# Patient Record
Sex: MALE | Race: WHITE | Employment: UNEMPLOYED | URBAN - METROPOLITAN AREA
[De-identification: names, ages, dates, MRNs, and addresses within clinical notes are randomized per-mention and may not be internally consistent; named-entity substitution may affect disease eponyms.]

---

## 2018-03-22 ENCOUNTER — HOSPITAL ENCOUNTER (EMERGENCY)
Facility: CLINIC | Age: 48
Discharge: HOME | End: 2018-03-22
Attending: EMERGENCY MEDICINE
Payer: COMMERCIAL

## 2018-03-22 VITALS
BODY MASS INDEX: 24.25 KG/M2 | DIASTOLIC BLOOD PRESSURE: 89 MMHG | WEIGHT: 160 LBS | HEART RATE: 80 BPM | OXYGEN SATURATION: 96 % | TEMPERATURE: 97.7 F | RESPIRATION RATE: 16 BRPM | SYSTOLIC BLOOD PRESSURE: 141 MMHG | HEIGHT: 68 IN

## 2018-03-22 DIAGNOSIS — J32.0 CHRONIC MAXILLARY SINUSITIS: Primary | ICD-10-CM

## 2018-03-22 PROCEDURE — 99282 EMERGENCY DEPT VISIT SF MDM: CPT

## 2018-03-22 PROCEDURE — 6370000000 HC RX 637 (ALT 250 FOR IP): Performed by: EMERGENCY MEDICINE

## 2018-03-22 RX ORDER — PREDNISONE 20 MG/1
60 TABLET ORAL ONCE
Status: COMPLETED | OUTPATIENT
Start: 2018-03-22 | End: 2018-03-22

## 2018-03-22 RX ORDER — OXYCODONE HYDROCHLORIDE AND ACETAMINOPHEN 5; 325 MG/1; MG/1
2 TABLET ORAL ONCE
Status: COMPLETED | OUTPATIENT
Start: 2018-03-22 | End: 2018-03-22

## 2018-03-22 RX ORDER — PREDNISONE 50 MG/1
50 TABLET ORAL DAILY
Qty: 4 TABLET | Refills: 0 | Status: SHIPPED | OUTPATIENT
Start: 2018-03-22 | End: 2018-03-26

## 2018-03-22 RX ADMIN — OXYCODONE HYDROCHLORIDE AND ACETAMINOPHEN 2 TABLET: 5; 325 TABLET ORAL at 20:28

## 2018-03-22 RX ADMIN — PREDNISONE 60 MG: 20 TABLET ORAL at 20:28

## 2018-03-22 ASSESSMENT — PAIN SCALES - GENERAL
PAINLEVEL_OUTOF10: 9
PAINLEVEL_OUTOF10: 9

## 2018-03-22 ASSESSMENT — PAIN DESCRIPTION - ORIENTATION: ORIENTATION: LEFT;RIGHT

## 2018-03-22 ASSESSMENT — PAIN DESCRIPTION - LOCATION: LOCATION: FACE;EAR;JAW

## 2018-03-22 ASSESSMENT — PAIN DESCRIPTION - PAIN TYPE: TYPE: ACUTE PAIN

## 2018-03-22 ASSESSMENT — PAIN DESCRIPTION - FREQUENCY: FREQUENCY: CONTINUOUS

## 2018-03-22 ASSESSMENT — PAIN DESCRIPTION - DESCRIPTORS: DESCRIPTORS: PRESSURE

## 2018-03-23 NOTE — ED TRIAGE NOTES
Pt to ed with complaints of sinus pressure, from behind the eyes, extending to the face and ears. Pt called pcp and was called in a prescription which he has not picked up yet.

## 2021-08-13 ENCOUNTER — HOSPITAL ENCOUNTER (EMERGENCY)
Facility: CLINIC | Age: 51
Discharge: HOME | End: 2021-08-13
Attending: EMERGENCY MEDICINE

## 2021-08-13 VITALS
BODY MASS INDEX: 22.43 KG/M2 | SYSTOLIC BLOOD PRESSURE: 167 MMHG | OXYGEN SATURATION: 100 % | TEMPERATURE: 97.9 F | HEIGHT: 68 IN | RESPIRATION RATE: 17 BRPM | DIASTOLIC BLOOD PRESSURE: 84 MMHG | HEART RATE: 83 BPM | WEIGHT: 148 LBS

## 2021-08-13 DIAGNOSIS — R06.00 DYSPNEA, UNSPECIFIED TYPE: Primary | ICD-10-CM

## 2021-08-13 PROCEDURE — 99285 EMERGENCY DEPT VISIT HI MDM: CPT

## 2021-08-13 RX ORDER — LEVETIRACETAM 500 MG/1
1000 TABLET ORAL 2 TIMES DAILY
COMMUNITY
End: 2022-10-31

## 2021-08-13 RX ORDER — ZIPRASIDONE HYDROCHLORIDE 40 MG/1
40 CAPSULE ORAL 2 TIMES DAILY WITH MEALS
COMMUNITY

## 2021-08-13 ASSESSMENT — PAIN DESCRIPTION - LOCATION: LOCATION: HEAD

## 2021-08-13 ASSESSMENT — PAIN DESCRIPTION - DESCRIPTORS: DESCRIPTORS: DULL

## 2021-08-13 ASSESSMENT — PAIN SCALES - GENERAL: PAINLEVEL_OUTOF10: 4

## 2021-08-13 ASSESSMENT — PAIN DESCRIPTION - PAIN TYPE: TYPE: ACUTE PAIN

## 2021-08-14 NOTE — ED PROVIDER NOTES
eMERGENCY dEPARTMENT eNCOUnter      Pt Name: Hamlet Arguelles  MRN: [id number]  [place] 1970  Date of evaluation: 8/13/2021      CHIEF COMPLAINT       Chief Complaint   Patient presents with    Shortness of Breath    Dizziness    Headache         HISTORY OF PRESENT ILLNESS    Hamlet Arguelles is a 45 y.o. male who presents for his of breath and lightheadedness. Patient states he feels much better at this time he apparently has a history of seizures he states often his seizures start off with shortness of breath lightheadedness often is found on the floor later he says he did not get that way started feeling little lightheaded started getting short of breath by time the squad arrived he was starting feel better as he was coming down his breathing was more back to normal he denied associated chest pain shortness of breath fever chills denied any any other complaints to me at this time        REVIEW OF SYSTEMS       Review of systems are all reviewed and negative except stated above in HPI    [address]    has a past medical history of Anxiety, HIV (human immunodeficiency virus infection) ([facility] [address] and Seizures ([facility] [address] SURGICAL HISTORY      has a past surgical history that includes Tonsillectomy and adenoidectomy; Nasal septum surgery; and Hemorrhoid surgery. CURRENT MEDICATIONS       Discharge Medication List as of 8/13/2021  9:13 PM      CONTINUE these medications which have NOT CHANGED    Details   levETIRAcetam (KEPPRA) 500 MG tablet Take 1,000 mg by mouth 2 times daily 1g in AMHistorical Med      Quuzlvo-Vipup-Qnxokrwz-TenofAF (GENVOYA PO) Take by mouthHistorical Med      ziprasidone (GEODON) 40 MG capsule Take 40 mg by mouth 2 times daily (with meals)Historical Med             ALLERGIES     is allergic to vicodin [hydrocodone-acetaminophen]. FAMILY HISTORY     has no family status information on file. family history is not on file.     SOCIAL HISTORY      reports that he has been smoking. He has been smoking about 1.00 pack per day. He has never used smokeless tobacco. He reports current alcohol use. He reports that he does not use drugs. PHYSICAL EXAM     INITIAL VITALS:  height is 5' 8\" (1.727 m) and weight is 67.1 kg (148 lb). His oral temperature is 97.9 °F (36.6 °C). His blood pressure is 167/84 (abnormal) and his pulse is 83. His respiration is 17 and oxygen saturation is 100%. General: Patient alert nontoxic-appearing male in no apparent distress  HEENT: Head is atraumatic  Neck: Supple  Respiratory: Lung sounds are clear bilateral  Cardiac: Heart is regular rate and rhythm  GI: Abdomen soft nontender  Neuro: Patient has no gross focal neurological deficits at bedside exam    DIFFERENTIAL DIAGNOSIS/ MDM:     Seizure aura anxiety dyspnea    DIAGNOSTIC RESULTS     EKG: All EKG's are interpreted by the Emergency Department Physician who either signs or Co-signs this chart in the absence of a cardiologist.    EKG interpreted by me reveals normal sinus rhythm with a rate of 79 with no acute ST elevation depressions normal GA interval normal QS complex normal axis    RADIOLOGY:   I directly visualized the following  images and reviewed the radiologist interpretations:  No orders to display         LABS:  Labs Reviewed - No data to display      EMERGENCY DEPARTMENT COURSE:   Vitals:    Vitals:    08/13/21 1955   BP: (!) 167/84   Pulse: 83   Resp: 17   Temp: 97.9 °F (36.6 °C)   TempSrc: Oral   SpO2: 100%   Weight: 67.1 kg (148 lb)   Height: 5' 8\" (1.727 m)     -------------------------  BP: (!) 167/84, Temp: 97.9 °F (36.6 °C), Pulse: 83, Resp: 17    No orders of the defined types were placed in this encounter.           Re-evaluation Notes    Patient states he is feeling better he is uncertain whether he just gets anxious when he starts feeling this way as he does not want to have 1 of these passing out episodes respiratory rate is normal sats are normal he has no focal findings on exam we did observe him for an hour he states that he feels perfectly fine wants to go home this time will be discharged with follow-up and return if worse    CRITICAL CARE:   None      CONSULTS:      PROCEDURES:  None    FINAL IMPRESSION      1. Dyspnea, unspecified type          DISPOSITION/PLAN   DISPOSITION Decision To Discharge 08/13/2021 09:12:52 PM      Condition on Disposition    Stable    PATIENT REFERRED TO:  Mily Buckley MD  [address] [place] [address]  [phone]      As needed      DISCHARGE MEDICATIONS:  Discharge Medication List as of 8/13/2021  9:13 PM          (Please note that portions of this note were completed with a voice recognition program.  Efforts were made to edit the dictations but occasionally words are mis-transcribed.)    Molly Loya MD,, MD, F.A.C.E.P.   Attending Emergency Physician        Molly Loya MD  08/13/21 1731

## 2021-08-14 NOTE — ED NOTES
Mode of arrival (squad #, walk in, police, etc) : Medic 59        Chief complaint(s): shortness of breath        Arrival Note (brief scenario, treatment PTA, etc). : Pt brought in by squad c/o shortness of breath and anxiety an hour prior to calling EMS. Pt states he has a Hx of seizures. Pt states this is how he normally gets before he has an episode. Pt states he did take his keppra prior to EMS arrival. Pt alert and oriented. No sing of distress noted. C= \"Have you ever felt that you should Cut down on your drinking? \"  No  A= \"Have people Annoyed you by criticizing your drinking? \"  No  G= \"Have you ever felt bad or Guilty about your drinking? \"  No  E= \"Have you ever had a drink as an Eye-opener first thing in the morning to steady your nerves or to help a hangover? \"  No      Deferred []      Reason for deferring: N/A]    *If yes to two or more: probable alcohol abuse. Delsa Peabody, RN  08/14/21 6401

## 2022-01-28 ENCOUNTER — HOSPITAL ENCOUNTER (EMERGENCY)
Facility: CLINIC | Age: 52
Discharge: HOME | End: 2022-01-28
Attending: EMERGENCY MEDICINE
Payer: MEDICAID

## 2022-01-28 VITALS
HEIGHT: 68 IN | OXYGEN SATURATION: 99 % | SYSTOLIC BLOOD PRESSURE: 138 MMHG | RESPIRATION RATE: 15 BRPM | TEMPERATURE: 98.4 F | HEART RATE: 70 BPM | WEIGHT: 150 LBS | BODY MASS INDEX: 22.73 KG/M2 | DIASTOLIC BLOOD PRESSURE: 82 MMHG

## 2022-01-28 DIAGNOSIS — G40.919 BREAKTHROUGH SEIZURE (HCC): Primary | ICD-10-CM

## 2022-01-28 DIAGNOSIS — G40.909 SEIZURE DISORDER (HCC): ICD-10-CM

## 2022-01-28 LAB
ABSOLUTE EOS #: 0.1 K/UL (ref 0–0.4)
ABSOLUTE IMMATURE GRANULOCYTE: ABNORMAL K/UL (ref 0–0.3)
ABSOLUTE LYMPH #: 2.9 K/UL (ref 1–4.8)
ABSOLUTE MONO #: 0.9 K/UL (ref 0.1–1.2)
ANION GAP: 9 MMOL/L (ref 7–16)
BASOPHILS # BLD: 2 % (ref 0–2)
BASOPHILS ABSOLUTE: 0.2 K/UL (ref 0–0.2)
DIFFERENTIAL TYPE: ABNORMAL
EOSINOPHILS RELATIVE PERCENT: 1 % (ref 1–4)
GFR NON-AFRICAN AMERICAN: NORMAL ML/MIN
GFR SERPL CREATININE-BSD FRML MDRD: NORMAL ML/MIN
GFR SERPL CREATININE-BSD FRML MDRD: NORMAL ML/MIN/{1.73_M2}
GLUCOSE BLD-MCNC: 163 MG/DL (ref 74–100)
HCT VFR BLD CALC: 46.4 % (ref 41–53)
HEMOGLOBIN: 15.6 G/DL (ref 13.5–17.5)
IMMATURE GRANULOCYTES: ABNORMAL %
KEPPRA: 3 UG/ML
LYMPHOCYTES # BLD: 22 % (ref 24–44)
MCH RBC QN AUTO: 33.5 PG (ref 26–34)
MCHC RBC AUTO-ENTMCNC: 33.6 G/DL (ref 31–37)
MCV RBC AUTO: 99.6 FL (ref 80–100)
MONOCYTES # BLD: 7 % (ref 2–11)
NRBC AUTOMATED: ABNORMAL PER 100 WBC
PDW BLD-RTO: 13.3 % (ref 12.5–15.4)
PLATELET # BLD: 186 K/UL (ref 140–450)
PLATELET ESTIMATE: ABNORMAL
PMV BLD AUTO: 8.9 FL (ref 6–12)
POC BUN: 18 MG/DL (ref 8–26)
POC CHLORIDE: 100 MMOL/L (ref 98–107)
POC CREATININE: 0.86 MG/DL (ref 0.51–1.19)
POC IONIZED CALCIUM: 1.19 MMOL/L (ref 1.15–1.33)
POC POTASSIUM: 4 MMOL/L (ref 3.5–4.5)
POC SODIUM: 136 MMOL/L (ref 138–146)
POC TCO2: 28 MMOL/L (ref 22–30)
RBC # BLD: 4.65 M/UL (ref 4.5–5.9)
RBC # BLD: ABNORMAL 10*6/UL
SEG NEUTROPHILS: 68 % (ref 36–66)
SEGMENTED NEUTROPHILS ABSOLUTE COUNT: 9.3 K/UL (ref 1.8–7.7)
WBC # BLD: 13.4 K/UL (ref 3.5–11)
WBC # BLD: ABNORMAL 10*3/UL

## 2022-01-28 PROCEDURE — 82947 ASSAY GLUCOSE BLOOD QUANT: CPT

## 2022-01-28 PROCEDURE — 82565 ASSAY OF CREATININE: CPT

## 2022-01-28 PROCEDURE — 82330 ASSAY OF CALCIUM: CPT

## 2022-01-28 PROCEDURE — 85025 COMPLETE CBC W/AUTO DIFF WBC: CPT

## 2022-01-28 PROCEDURE — 84520 ASSAY OF UREA NITROGEN: CPT

## 2022-01-28 PROCEDURE — 80177 DRUG SCRN QUAN LEVETIRACETAM: CPT

## 2022-01-28 PROCEDURE — 99285 EMERGENCY DEPT VISIT HI MDM: CPT

## 2022-01-28 PROCEDURE — 36415 COLL VENOUS BLD VENIPUNCTURE: CPT

## 2022-01-28 PROCEDURE — 80051 ELECTROLYTE PANEL: CPT

## 2022-01-28 RX ORDER — LEVETIRACETAM 750 MG/1
1500 TABLET ORAL ONCE
Status: DISCONTINUED | OUTPATIENT
Start: 2022-01-28 | End: 2022-01-28 | Stop reason: HOSPADM

## 2022-01-28 NOTE — ED PROVIDER NOTES
[facility] ED  [address] [facility]  Phone: [address]      Pt Name: Claritza Bangura  MRN: [id number]  [place] 1970  Date of evaluation: 1/28/2022    CHIEF COMPLAINT       Chief Complaint   Patient presents with    Seizures       HISTORY OF PRESENT ILLNESS    Claritza Bangura is a 45 y.o. male who presents with a history of having a seizure today. States he has had seizures for many years and today he came to the ER at the urgings of his roommate. States that he usually has a seizure almost every day. He has not missed any doses of his medicines he states that he recently had a EEG performed at home. He states that he sees the neurologist at the [facility]. He states that he is currently taking Keppra 1500 mg every 12 hours. No recent adjustments in his medicines have been made. States that he has been eating and sleeping as he normally does. Denies alcohol use or other meds. REVIEW OF SYSTEMS     Constitutional: No fevers or chills   HEENT: No sore throat, rhinorrhea, or earache   Eyes: No blurry vision or double vision no drainage   Cardiovascular: No chest pain or tachycardia   Respiratory: No wheezing or shortness of breath no cough   Gastrointestinal: No nausea, vomiting, diarrhea, constipation, or abdominal pain   : No hematuria or dysuria   Musculoskeletal: No swelling or pain   Skin: No rash   Neurological: See above  PAST MEDICAL HISTORY    has a past medical history of Anxiety, HIV (human immunodeficiency virus infection) ([facility] [address] and Seizures ([facility] [address] SURGICAL HISTORY      has a past surgical history that includes Tonsillectomy and adenoidectomy; Nasal septum surgery; and Hemorrhoid surgery.     CURRENT MEDICATIONS       Previous Medications    PRRDJSX-IWQXN-UBLKDOYH-TENOFAF (GENVOYA PO)    Take by mouth    LEVETIRACETAM (KEPPRA) 500 MG TABLET    Take 1,000 mg by mouth 2 times daily 1g in AM    ZIPRASIDONE (GEODON) 40 MG CAPSULE    Take 15.4 %    Platelets 669 921 - 437 k/uL    MPV 8.9 6.0 - 12.0 fL    NRBC Automated NOT REPORTED per 100 WBC    Differential Type NOT REPORTED     Seg Neutrophils 68 (H) 36 - 66 %    Lymphocytes 22 (L) 24 - 44 %    Monocytes 7 2 - 11 %    Eosinophils % 1 1 - 4 %    Basophils 2 0 - 2 %    Immature Granulocytes NOT REPORTED 0 %    Segs Absolute 9.30 (H) 1.8 - 7.7 k/uL    Absolute Lymph # 2.90 1.0 - 4.8 k/uL    Absolute Mono # 0.90 0.1 - 1.2 k/uL    Absolute Eos # 0.10 0.0 - 0.4 k/uL    Basophils Absolute 0.20 0.0 - 0.2 k/uL    Absolute Immature Granulocyte NOT REPORTED 0.00 - 0.30 k/uL    WBC Morphology NOT REPORTED     RBC Morphology NOT REPORTED     Platelet Estimate NOT REPORTED    ELECTROLYTES PLUS   Result Value Ref Range    POC Sodium 136 (L) 138 - 146 mmol/L    POC Potassium 4.0 3.5 - 4.5 mmol/L    POC Chloride 100 98 - 107 mmol/L    POC TCO2 28 22 - 30 mmol/L    Anion Gap 9 7 - 16 mmol/L   CALCIUM, IONIC (POC)   Result Value Ref Range    POC Ionized Calcium 1.19 1.15 - 1.33 mmol/L   Creatinine W/GFR Point of Care   Result Value Ref Range    POC Creatinine 0.86 0.51 - 1.19 mg/dL    GFR Comment NOT REPORTED >60 mL/min    GFR Non- NOT REPORTED >60 mL/min    GFR Comment         POCT urea (BUN)   Result Value Ref Range    POC BUN 18 8 - 26 mg/dL   POCT Glucose   Result Value Ref Range    POC Glucose 163 (H) 74 - 100 mg/dL       Not indicated unless otherwise documented above    RADIOLOGY:   I reviewed the radiologist interpretations:    No orders to display       Not indicated unless otherwise documented above    EMERGENCY DEPARTMENT COURSE:     The patient was given the following medications:  Orders Placed This Encounter   Medications    levETIRAcetam (KEPPRA) tablet 1,500 mg        Vitals:   -------------------------  BP (!) 142/84   Pulse 71   Resp 16   Ht 5' 8\" (1.727 m)   Wt 68 kg (150 lb)   SpO2 100%   BMI 22.81 kg/m²         I have reviewed the disposition diagnosis with the patient and or their family/guardian. I have answered their questions and given discharge instructions. They voiced understanding of these instructions and did not have any furtherquestions or complaints. CRITICAL CARE:    None    CONSULTS:    None    PROCEDURES:    None      OARRS Report if indicated             FINAL IMPRESSION      1. Breakthrough seizure ([facility] [address]    2. Seizure disorder [facility])          DISPOSITION/PLAN   DISPOSITION Decision To Discharge 01/28/2022 06:45:28 PM        CONDITION ON DISPOSITION: STABLE       PATIENT REFERRED TO:  No follow-up provider specified.     DISCHARGE MEDICATIONS:  New Prescriptions    No medications on file       (Please note that portions of thisnote were completed with a voice recognition program.  Efforts were made to edit the dictations but occasionally words are mis-transcribed.)    Biju Melendrez MD,, MD  Attending Emergency Physician        Biju Melendrez MD  01/28/22 5964

## 2022-10-31 ENCOUNTER — HOSPITAL ENCOUNTER (EMERGENCY)
Facility: CLINIC | Age: 52
Discharge: HOME | End: 2022-10-31
Attending: EMERGENCY MEDICINE
Payer: MEDICAID

## 2022-10-31 ENCOUNTER — APPOINTMENT (OUTPATIENT)
Dept: CT IMAGING | Facility: CLINIC | Age: 52
End: 2022-10-31
Payer: MEDICAID

## 2022-10-31 VITALS
BODY MASS INDEX: 23.04 KG/M2 | HEART RATE: 67 BPM | RESPIRATION RATE: 16 BRPM | DIASTOLIC BLOOD PRESSURE: 91 MMHG | SYSTOLIC BLOOD PRESSURE: 140 MMHG | TEMPERATURE: 98 F | OXYGEN SATURATION: 97 % | HEIGHT: 68 IN | WEIGHT: 152 LBS

## 2022-10-31 DIAGNOSIS — Z87.898 HISTORY OF SEIZURE: Primary | ICD-10-CM

## 2022-10-31 DIAGNOSIS — I10 ESSENTIAL HYPERTENSION: ICD-10-CM

## 2022-10-31 LAB
ABSOLUTE EOS #: 0.5 K/UL (ref 0–0.4)
ABSOLUTE LYMPH #: 2.5 K/UL (ref 1–4.8)
ABSOLUTE MONO #: 0.8 K/UL (ref 0.1–1.2)
ALBUMIN SERPL-MCNC: 4.6 G/DL (ref 3.5–5.2)
ALBUMIN/GLOBULIN RATIO: 1.8 (ref 1–2.5)
ALP BLD-CCNC: 104 U/L (ref 40–129)
ALT SERPL-CCNC: 32 U/L (ref 5–41)
ANION GAP SERPL CALCULATED.3IONS-SCNC: 5 MMOL/L (ref 9–17)
AST SERPL-CCNC: 20 U/L
BASOPHILS # BLD: 1 % (ref 0–2)
BASOPHILS ABSOLUTE: 0.1 K/UL (ref 0–0.2)
BILIRUB SERPL-MCNC: 0.4 MG/DL (ref 0.3–1.2)
BUN BLDV-MCNC: 15 MG/DL (ref 6–20)
CALCIUM SERPL-MCNC: 9.1 MG/DL (ref 8.6–10.4)
CHLORIDE BLD-SCNC: 105 MMOL/L (ref 98–107)
CO2: 29 MMOL/L (ref 20–31)
CREAT SERPL-MCNC: 0.6 MG/DL (ref 0.7–1.2)
EOSINOPHILS RELATIVE PERCENT: 6 % (ref 1–4)
GFR SERPL CREATININE-BSD FRML MDRD: >60 ML/MIN/1.73M2
GLUCOSE BLD-MCNC: 99 MG/DL (ref 70–99)
HCT VFR BLD CALC: 50.2 % (ref 41–53)
HEMOGLOBIN: 16.4 G/DL (ref 13.5–17.5)
LAMOTRIGINE LEVEL: 4.7 UG/ML (ref 3–15)
LYMPHOCYTES # BLD: 30 % (ref 24–44)
MCH RBC QN AUTO: 33 PG (ref 26–34)
MCHC RBC AUTO-ENTMCNC: 32.6 G/DL (ref 31–37)
MCV RBC AUTO: 101.3 FL (ref 80–100)
MONOCYTES # BLD: 10 % (ref 2–11)
PDW BLD-RTO: 13.2 % (ref 12.5–15.4)
PLATELET # BLD: 213 K/UL (ref 140–450)
PMV BLD AUTO: 8.7 FL (ref 6–12)
POTASSIUM SERPL-SCNC: 4.5 MMOL/L (ref 3.7–5.3)
RBC # BLD: 4.96 M/UL (ref 4.5–5.9)
SEG NEUTROPHILS: 53 % (ref 36–66)
SEGMENTED NEUTROPHILS ABSOLUTE COUNT: 4.4 K/UL (ref 1.8–7.7)
SODIUM BLD-SCNC: 139 MMOL/L (ref 135–144)
TOTAL PROTEIN: 7.1 G/DL (ref 6.4–8.3)
WBC # BLD: 8.3 K/UL (ref 3.5–11)

## 2022-10-31 PROCEDURE — 99284 EMERGENCY DEPT VISIT MOD MDM: CPT

## 2022-10-31 PROCEDURE — 36415 COLL VENOUS BLD VENIPUNCTURE: CPT

## 2022-10-31 PROCEDURE — 80053 COMPREHEN METABOLIC PANEL: CPT

## 2022-10-31 PROCEDURE — 70450 CT HEAD/BRAIN W/O DYE: CPT

## 2022-10-31 PROCEDURE — 85025 COMPLETE CBC W/AUTO DIFF WBC: CPT

## 2022-10-31 PROCEDURE — 2580000003 HC RX 258: Performed by: EMERGENCY MEDICINE

## 2022-10-31 PROCEDURE — 80175 DRUG SCREEN QUAN LAMOTRIGINE: CPT

## 2022-10-31 RX ORDER — 0.9 % SODIUM CHLORIDE 0.9 %
1000 INTRAVENOUS SOLUTION INTRAVENOUS ONCE
Status: COMPLETED | OUTPATIENT
Start: 2022-10-31 | End: 2022-10-31

## 2022-10-31 RX ORDER — LAMOTRIGINE 200 MG/1
200 TABLET ORAL 2 TIMES DAILY
COMMUNITY

## 2022-10-31 RX ADMIN — SODIUM CHLORIDE 1000 ML: 9 INJECTION, SOLUTION INTRAVENOUS at 11:54

## 2022-10-31 ASSESSMENT — PAIN - FUNCTIONAL ASSESSMENT: PAIN_FUNCTIONAL_ASSESSMENT: NONE - DENIES PAIN

## 2022-10-31 NOTE — ED PROVIDER NOTES
[address] ENCOUNTER      Pt Name: Jean Claude Flanagan  MRN: [id number]  [place] 1970  Date of evaluation: 10/31/2022      CHIEF COMPLAINT       Chief Complaint   Patient presents with    Seizures     Have Hx of seizures, last seizure was on Thursday, room mate found him on floor, hit left side of head, feeling groggy and tired since then         [facility]      The patient presents with increasing seizures. His last seizure was Thursday. Today is Monday. The patient has been referred to his seizure specialist.  He goes to the [address] [place] [address] to see a neurologist.  He says he has been feeling groggy and tired since he fell last Thursday after a seizure. He did strike his head. He has not been vomiting however. He has not had mental status change since. The patient is on Lamictal.  He has not been changing his medications around. He has a history of HIV but says his viral load is undetectable. The patient denies fever or cough. He denies focal weakness or numbness. He says he sometimes feels dizzy and see spots prior to his seizure and he has been having more of that over the past few days. REVIEW OF SYSTEMS       All systems reviewed and negative unless noted in HPI. The patient denies fever or constitutional symptoms. Sometimes sees spots prior to seizure. No vision change at this time. Denies any sore throat or rhinorrhea. Denies any neck pain or stiffness. Denies chest pain or shortness of breath. No nausea,  vomiting or diarrhea. Denies any dysuria. Denies urinary frequency or hematuria. Denies musculoskeletal injury or pain. Denies any weakness, numbness or focal neurologic deficit. Increased seizures. History of seizure disorder. Denies any skin rash or edema. No recent psychiatric issues. History of HIV.        PAST MEDICAL HISTORY    has a past medical history of Anxiety, HIV (human immunodeficiency virus infection) ([facility] [address] and Seizures ([facility] [address] SURGICAL HISTORY      has a past surgical history that includes Tonsillectomy and adenoidectomy; Nasal septum surgery; and Hemorrhoid surgery. CURRENT MEDICATIONS       Previous Medications    BICTEGRAVIR-EMTRICITAB-TENOFOV (BIKTARVY PO)    Take by mouth    LAMOTRIGINE (LAMICTAL) 200 MG TABLET    Take 200 mg by mouth 2 times daily    ZIPRASIDONE (GEODON) 40 MG CAPSULE    Take 40 mg by mouth 2 times daily (with meals)       ALLERGIES     is allergic to vicodin [hydrocodone-acetaminophen]. FAMILY HISTORY     has no family status information on file. family history is not on file. SOCIAL HISTORY      reports that he has been smoking. He has been smoking an average of 1 pack per day. He has never used smokeless tobacco. He reports current alcohol use. He reports that he does not use drugs. PHYSICAL EXAM     INITIAL VITALS:  height is 5' 8\" (1.727 m) and weight is 68.9 kg (152 lb). His oral temperature is 98 °F (36.7 °C). His blood pressure is 140/91 (abnormal) and his pulse is 67. His respiration is 16 and oxygen saturation is 97%. The patient is alert and oriented, in no apparent distress. HEENT is atraumatic. Pupils are PERRL at 4 mm with normal extraocular motion. No nystagmus. Mucous membranes moist.    Neck is supple. No meningismus. Heart sounds regular rate and rhythm with no gallops, murmurs, or rubs. Lungs clear, no wheezes, rales or rhonchi. Abdomen: soft, nontender with no pain to palpation. No pulsatile mass. No rebound or guarding. Musculoskeletal exam shows no evidence of trauma. Normal distal pulses in all extremities. Skin: no rash or edema. Neurological exam reveals cranial nerves 2 through 12 grossly intact. Patient has equal  and normal deep tendon reflexes. Psychiatric: no hallucinations or suicidal ideation. Lymphatics.:  No lymphadenopathy.        DIFFERENTIAL DIAGNOSIS/ MDM:     Dehydration, subtherapeutic Lamictal level, intracranial hemorrhage, head injury    DIAGNOSTIC RESULTS     RADIOLOGY:   I reviewed the radiologist interpretations:  CT HEAD WO CONTRAST   Final Result   No acute intracranial abnormality. CT HEAD WO CONTRAST (Final result)  Result time 10/31/22 12:37:59  Final result by Mitzy Cornelius MD (10/31/22 12:37:59)                Impression:    No acute intracranial abnormality. Narrative:    EXAMINATION:   CT OF THE HEAD WITHOUT CONTRAST  10/31/2022 9:21 am     TECHNIQUE:   CT of the head was performed without the administration of intravenous   contrast. Automated exposure control, iterative reconstruction, and/or weight   based adjustment of the mA/kV was utilized to reduce the radiation dose to as   low as reasonably achievable. COMPARISON:   None. HISTORY:   ORDERING SYSTEM PROVIDED HISTORY: fall; history of seizures   TECHNOLOGIST PROVIDED HISTORY:     fall; history of seizures   Decision Support Exception - unselect if not a suspected or confirmed   emergency medical condition->Emergency Medical Condition (MA)   Reason for Exam: Eval s/p possible unwitnessed seizure. Pt was found on the   floor by roommate. Pt has hx seizures. Pt states no acute head complaints at   time of exam.   Relevant Medical/Surgical History: seizures     FINDINGS:   BRAIN/VENTRICLES: There is no acute intracranial hemorrhage, mass effect or   midline shift. No abnormal extra-axial fluid collection. The gray-white   differentiation is maintained without evidence of an acute infarct. There is   no evidence of hydrocephalus. ORBITS: The visualized portion of the orbits demonstrate no acute abnormality. SINUSES: The visualized paranasal sinuses and mastoid air cells demonstrate   no acute abnormality. Cerumen in the external auditory canals. SOFT TISSUES/SKULL:  No acute abnormality of the visualized skull or soft   tissues.  Vascular calcifications are seen compatible with atherosclerotic   disease. LABS:  Results for orders placed or performed during the hospital encounter of 10/31/22   CBC with Auto Differential   Result Value Ref Range    WBC 8.3 3.5 - 11.0 k/uL    RBC 4.96 4.5 - 5.9 m/uL    Hemoglobin 16.4 13.5 - 17.5 g/dL    Hematocrit 50.2 41 - 53 %    .3 (H) 80 - 100 fL    MCH 33.0 26 - 34 pg    MCHC 32.6 31 - 37 g/dL    RDW 13.2 12.5 - 15.4 %    Platelets 921 136 - 031 k/uL    MPV 8.7 6.0 - 12.0 fL    Seg Neutrophils 53 36 - 66 %    Lymphocytes 30 24 - 44 %    Monocytes 10 2 - 11 %    Eosinophils % 6 (H) 1 - 4 %    Basophils 1 0 - 2 %    Segs Absolute 4.40 1.8 - 7.7 k/uL    Absolute Lymph # 2.50 1.0 - 4.8 k/uL    Absolute Mono # 0.80 0.1 - 1.2 k/uL    Absolute Eos # 0.50 (H) 0.0 - 0.4 k/uL    Basophils Absolute 0.10 0.0 - 0.2 k/uL   Comprehensive Metabolic Panel   Result Value Ref Range    Glucose 99 70 - 99 mg/dL    BUN 15 6 - 20 mg/dL    Creatinine 0.60 (L) 0.70 - 1.20 mg/dL    Est, Glom Filt Rate >60 >60 mL/min/1.73m2    Calcium 9.1 8.6 - 10.4 mg/dL    Sodium 139 135 - 144 mmol/L    Potassium 4.5 3.7 - 5.3 mmol/L    Chloride 105 98 - 107 mmol/L    CO2 29 20 - 31 mmol/L    Anion Gap 5 (L) 9 - 17 mmol/L    Alkaline Phosphatase 104 40 - 129 U/L    ALT 32 5 - 41 U/L    AST 20 <40 U/L    Total Bilirubin 0.4 0.3 - 1.2 mg/dL    Total Protein 7.1 6.4 - 8.3 g/dL    Albumin 4.6 3.5 - 5.2 g/dL    Albumin/Globulin Ratio 1.8 1.0 - 2.5         EMERGENCY DEPARTMENT COURSE:   Vitals:    Vitals:    10/31/22 1124   BP: (!) 140/91   Pulse: 67   Resp: 16   Temp: 98 °F (36.7 °C)   TempSrc: Oral   SpO2: 97%   Weight: 68.9 kg (152 lb)   Height: 5' 8\" (1.727 m)     -------------------------  BP: (!) 140/91, Temp: 98 °F (36.7 °C), Heart Rate: 67, Resp: 16      Re-evaluation Notes    The Lamictal level is a send out. The patient can follow-up with his neurologist about this result the later date. His CT scan is reassuring.   The patient does have some elevated blood pressure which could contribute to his symptoms. I am advising that he follow-up with his doctor about this. He is discharged in good condition. FINAL IMPRESSION      1. History of seizure    2.  Essential hypertension          DISPOSITION/PLAN   DISPOSITION Decision To Discharge 10/31/2022 12:43:31 PM      Condition on Disposition    good    PATIENT REFERRED TO:  your neurologist and your primary care physician    In 2 days      DISCHARGE MEDICATIONS:  New Prescriptions    No medications on file       (Please note that portions of this note were completed with a voice recognition program.  Efforts were made to edit the dictations but occasionally words are mis-transcribed.)    Emilie Galdamez MD,, MD   Attending Emergency Physician       Elsi Lorenzana MD  10/31/22 8868

## 2022-10-31 NOTE — DISCHARGE INSTRUCTIONS
Talk to your neurologist about your seizure disorder and medications. Talk to your primary doctor about your high blood pressure. Return for seizures, mental status change, or if worse in any way. Please understand that at this time there is no evidence for a more serious underlying process, but that early in the process of an illness or injury, an emergency department workup can be falsely reassuring. You should contact your family doctor within the next 48 hours for a follow up appointment    Esvinkatarzynadavid Broderick!!!    From [facility] ([facility]) and [facility] Emergency Services    On behalf of the Emergency Department staff at [facility]), I would like to thank you for giving us the opportunity to address your health care needs and concerns. We hope that during your visit, our service was delivered in a professional and caring manner. Please keep [facility] ([facility]) in mind as we walk with you down the path to your own personal wellness. Please expect an automated text message or email from us so we can ask a few questions about your health and progress. Based on your answers, a clinician may call you back to offer help and instructions. Please understand that early in the process of an illness or injury, an emergency department workup can be falsely reassuring. If you notice any worsening, changing or persistent symptoms please call your family doctor or return to the ER immediately. Tell us how we did during your visit at [email or web address]   and let us know about your experience